# Patient Record
Sex: FEMALE | URBAN - METROPOLITAN AREA
[De-identification: names, ages, dates, MRNs, and addresses within clinical notes are randomized per-mention and may not be internally consistent; named-entity substitution may affect disease eponyms.]

---

## 2024-01-29 ENCOUNTER — OFFICE VISIT (OUTPATIENT)
Dept: URBAN - METROPOLITAN AREA CLINIC 113 | Facility: CLINIC | Age: 54
End: 2024-01-29

## 2024-01-31 ENCOUNTER — DASHBOARD ENCOUNTERS (OUTPATIENT)
Age: 54
End: 2024-01-31

## 2024-01-31 ENCOUNTER — OFFICE VISIT (OUTPATIENT)
Dept: URBAN - METROPOLITAN AREA CLINIC 113 | Facility: CLINIC | Age: 54
End: 2024-01-31
Payer: COMMERCIAL

## 2024-01-31 VITALS
HEIGHT: 62 IN | DIASTOLIC BLOOD PRESSURE: 92 MMHG | BODY MASS INDEX: 33.31 KG/M2 | RESPIRATION RATE: 18 BRPM | TEMPERATURE: 97.1 F | HEART RATE: 71 BPM | SYSTOLIC BLOOD PRESSURE: 138 MMHG | WEIGHT: 181 LBS

## 2024-01-31 DIAGNOSIS — K52.89 OTHER SPECIFIED NONINFECTIVE GASTROENTERITIS AND COLITIS: ICD-10-CM

## 2024-01-31 DIAGNOSIS — R10.84 GENERALIZED ABDOMINAL PAIN: ICD-10-CM

## 2024-01-31 DIAGNOSIS — K59.09 CHRONIC CONSTIPATION WITH OVERFLOW: ICD-10-CM

## 2024-01-31 PROCEDURE — 99204 OFFICE O/P NEW MOD 45 MIN: CPT

## 2024-01-31 RX ORDER — POLYETHYLENE GLYCOL 3350, SODIUM CHLORIDE, SODIUM BICARBONATE, POTASSIUM CHLORIDE 420; 11.2; 5.72; 1.48 G/4L; G/4L; G/4L; G/4L
AS DIRECTED POWDER, FOR SOLUTION ORAL ONCE
Qty: 420 GM | Refills: 0 | OUTPATIENT
Start: 2024-01-31 | End: 2024-03-01

## 2024-01-31 NOTE — HPI-TODAY'S VISIT:
53-year-old female presents for evaluation of abdominal pain.  She was seen in the ED on 12/14/2023.  CT scan of the abdomen/pelvis with contrast revealed marked wall thickening and mural edema in the distal ileum consistent with enteritis and could be secondary to inflammatory bowel disease.  She did have mildly enlarged mesenteric nodes which are likely reactive.  She did have small volume ascites.  Case was discussed with Dr. Sands who believed the patient small-volume ascites was likely secondary to the enteritis or IBD.  He recommended the patient to follow-up as an outpatient and recommended antibiotics at that time.  Abdominal exam was otherwise benign and labs were unremarkable.  She was discharged on ciprofloxacin.  She had a severe constipation exacerbation prior to her ER visit. She took off brand MOM which emptied her bowels however she felt ill the next day. She reported upper abdominal pain that radiated into her chest. This was associated with nausea and vomiting. This has since resolved. Symptoms improved following the course of antibiotics however she does still have intermittent abdominal pain. Denies fevers or chills.  She has constipation predominant bowel habits. She may go a week without a bowel movement followed by a blowout. She has had fecal incontinence while in bed three times over the last 6 months. She does endorse fecal urgency. She has nocturnal stools. Denies blood per rectum or melena. She had a colonoscopy over ten years ago. She cannot recall what this done for and what the results were. No family history of IBD. Her paternal uncle was diagnosed with colon cancer in his 60s.

## 2024-02-12 ENCOUNTER — COLON (OUTPATIENT)
Dept: URBAN - METROPOLITAN AREA MEDICAL CENTER 2 | Facility: MEDICAL CENTER | Age: 54
End: 2024-02-12

## 2024-02-12 RX ORDER — POLYETHYLENE GLYCOL 3350, SODIUM CHLORIDE, SODIUM BICARBONATE, POTASSIUM CHLORIDE 420; 11.2; 5.72; 1.48 G/4L; G/4L; G/4L; G/4L
AS DIRECTED POWDER, FOR SOLUTION ORAL ONCE
Qty: 420 GM | Refills: 0 | Status: ACTIVE | COMMUNITY
Start: 2024-01-31 | End: 2024-03-01

## 2024-04-23 ENCOUNTER — COLON (OUTPATIENT)
Dept: URBAN - METROPOLITAN AREA MEDICAL CENTER 2 | Facility: MEDICAL CENTER | Age: 54
End: 2024-04-23

## 2024-09-10 ENCOUNTER — CLAIMS CREATED FROM THE CLAIM WINDOW (OUTPATIENT)
Dept: URBAN - METROPOLITAN AREA MEDICAL CENTER 2 | Facility: MEDICAL CENTER | Age: 54
End: 2024-09-10
Payer: COMMERCIAL

## 2024-09-10 DIAGNOSIS — R11.2 ACUTE NAUSEA WITH NONBILIOUS VOMITING: ICD-10-CM

## 2024-09-10 DIAGNOSIS — R10.84 ABDOMINAL CRAMPING, GENERALIZED: ICD-10-CM

## 2024-09-10 DIAGNOSIS — R93.3 ABN FINDINGS-GI TRACT: ICD-10-CM

## 2024-09-10 PROCEDURE — 99254 IP/OBS CNSLTJ NEW/EST MOD 60: CPT | Performed by: STUDENT IN AN ORGANIZED HEALTH CARE EDUCATION/TRAINING PROGRAM

## 2024-09-10 PROCEDURE — 99222 1ST HOSP IP/OBS MODERATE 55: CPT | Performed by: STUDENT IN AN ORGANIZED HEALTH CARE EDUCATION/TRAINING PROGRAM

## 2024-09-10 PROCEDURE — G8427 DOCREV CUR MEDS BY ELIG CLIN: HCPCS | Performed by: STUDENT IN AN ORGANIZED HEALTH CARE EDUCATION/TRAINING PROGRAM

## 2024-09-11 ENCOUNTER — CLAIMS CREATED FROM THE CLAIM WINDOW (OUTPATIENT)
Dept: URBAN - METROPOLITAN AREA MEDICAL CENTER 2 | Facility: MEDICAL CENTER | Age: 54
End: 2024-09-11
Payer: COMMERCIAL

## 2024-09-11 DIAGNOSIS — K56.690 OTHER PARTIAL INTESTINAL OBSTRUCTION: ICD-10-CM

## 2024-09-11 DIAGNOSIS — K52.89 OTHER AND UNSPECIFIED NONINFECTIOUS GASTROENTERITIS AND COLITIS: ICD-10-CM

## 2024-09-11 PROCEDURE — 99232 SBSQ HOSP IP/OBS MODERATE 35: CPT | Performed by: STUDENT IN AN ORGANIZED HEALTH CARE EDUCATION/TRAINING PROGRAM

## 2024-09-12 ENCOUNTER — CLAIMS CREATED FROM THE CLAIM WINDOW (OUTPATIENT)
Dept: URBAN - METROPOLITAN AREA MEDICAL CENTER 2 | Facility: MEDICAL CENTER | Age: 54
End: 2024-09-12
Payer: COMMERCIAL

## 2024-09-12 DIAGNOSIS — K52.89 OTHER AND UNSPECIFIED NONINFECTIOUS GASTROENTERITIS AND COLITIS: ICD-10-CM

## 2024-09-12 DIAGNOSIS — K56.690 OTHER PARTIAL INTESTINAL OBSTRUCTION: ICD-10-CM

## 2024-09-12 PROCEDURE — 99232 SBSQ HOSP IP/OBS MODERATE 35: CPT | Performed by: STUDENT IN AN ORGANIZED HEALTH CARE EDUCATION/TRAINING PROGRAM

## 2024-09-13 ENCOUNTER — CLAIMS CREATED FROM THE CLAIM WINDOW (OUTPATIENT)
Dept: URBAN - METROPOLITAN AREA MEDICAL CENTER 2 | Facility: MEDICAL CENTER | Age: 54
End: 2024-09-13
Payer: COMMERCIAL

## 2024-09-13 DIAGNOSIS — K52.89 OTHER AND UNSPECIFIED NONINFECTIOUS GASTROENTERITIS AND COLITIS: ICD-10-CM

## 2024-09-13 DIAGNOSIS — K56.690 OTHER PARTIAL INTESTINAL OBSTRUCTION: ICD-10-CM

## 2024-09-13 PROCEDURE — 99232 SBSQ HOSP IP/OBS MODERATE 35: CPT | Performed by: INTERNAL MEDICINE

## 2024-09-14 ENCOUNTER — CLAIMS CREATED FROM THE CLAIM WINDOW (OUTPATIENT)
Dept: URBAN - METROPOLITAN AREA MEDICAL CENTER 2 | Facility: MEDICAL CENTER | Age: 54
End: 2024-09-14
Payer: COMMERCIAL

## 2024-09-14 DIAGNOSIS — R06.02 DYSPNEA: ICD-10-CM

## 2024-09-14 DIAGNOSIS — K56.690 OTHER PARTIAL INTESTINAL OBSTRUCTION: ICD-10-CM

## 2024-09-14 DIAGNOSIS — K52.89 OTHER AND UNSPECIFIED NONINFECTIOUS GASTROENTERITIS AND COLITIS: ICD-10-CM

## 2024-09-14 PROCEDURE — 99232 SBSQ HOSP IP/OBS MODERATE 35: CPT | Performed by: INTERNAL MEDICINE

## 2024-09-15 ENCOUNTER — CLAIMS CREATED FROM THE CLAIM WINDOW (OUTPATIENT)
Dept: URBAN - METROPOLITAN AREA MEDICAL CENTER 2 | Facility: MEDICAL CENTER | Age: 54
End: 2024-09-15
Payer: COMMERCIAL

## 2024-09-15 DIAGNOSIS — K56.690 OTHER PARTIAL INTESTINAL OBSTRUCTION: ICD-10-CM

## 2024-09-15 DIAGNOSIS — R06.02 BREATH SHORTNESS: ICD-10-CM

## 2024-09-15 DIAGNOSIS — K52.89 OTHER AND UNSPECIFIED NONINFECTIOUS GASTROENTERITIS AND COLITIS: ICD-10-CM

## 2024-09-15 PROCEDURE — 99232 SBSQ HOSP IP/OBS MODERATE 35: CPT | Performed by: INTERNAL MEDICINE

## 2024-09-16 ENCOUNTER — CLAIMS CREATED FROM THE CLAIM WINDOW (OUTPATIENT)
Dept: URBAN - METROPOLITAN AREA MEDICAL CENTER 2 | Facility: MEDICAL CENTER | Age: 54
End: 2024-09-16
Payer: COMMERCIAL

## 2024-09-16 DIAGNOSIS — K52.89 OTHER AND UNSPECIFIED NONINFECTIOUS GASTROENTERITIS AND COLITIS: ICD-10-CM

## 2024-09-16 DIAGNOSIS — K56.690 OTHER PARTIAL INTESTINAL OBSTRUCTION: ICD-10-CM

## 2024-09-16 PROCEDURE — 99232 SBSQ HOSP IP/OBS MODERATE 35: CPT | Performed by: INTERNAL MEDICINE

## 2024-09-17 ENCOUNTER — CLAIMS CREATED FROM THE CLAIM WINDOW (OUTPATIENT)
Dept: URBAN - METROPOLITAN AREA MEDICAL CENTER 2 | Facility: MEDICAL CENTER | Age: 54
End: 2024-09-17
Payer: COMMERCIAL

## 2024-09-17 DIAGNOSIS — K52.89 OTHER AND UNSPECIFIED NONINFECTIOUS GASTROENTERITIS AND COLITIS: ICD-10-CM

## 2024-09-17 DIAGNOSIS — K56.690 OTHER PARTIAL INTESTINAL OBSTRUCTION: ICD-10-CM

## 2024-09-17 PROCEDURE — 99232 SBSQ HOSP IP/OBS MODERATE 35: CPT | Performed by: INTERNAL MEDICINE

## 2024-09-18 ENCOUNTER — CLAIMS CREATED FROM THE CLAIM WINDOW (OUTPATIENT)
Dept: URBAN - METROPOLITAN AREA MEDICAL CENTER 2 | Facility: MEDICAL CENTER | Age: 54
End: 2024-09-18
Payer: COMMERCIAL

## 2024-09-18 DIAGNOSIS — K56.690 OTHER PARTIAL INTESTINAL OBSTRUCTION: ICD-10-CM

## 2024-09-18 DIAGNOSIS — K52.89 OTHER AND UNSPECIFIED NONINFECTIOUS GASTROENTERITIS AND COLITIS: ICD-10-CM

## 2024-09-18 PROCEDURE — 99232 SBSQ HOSP IP/OBS MODERATE 35: CPT | Performed by: INTERNAL MEDICINE

## 2024-09-19 ENCOUNTER — CLAIMS CREATED FROM THE CLAIM WINDOW (OUTPATIENT)
Dept: URBAN - METROPOLITAN AREA MEDICAL CENTER 2 | Facility: MEDICAL CENTER | Age: 54
End: 2024-09-19
Payer: COMMERCIAL

## 2024-09-19 DIAGNOSIS — R10.84 ABDOMINAL CRAMPING, GENERALIZED: ICD-10-CM

## 2024-09-19 DIAGNOSIS — K56.690 OTHER PARTIAL INTESTINAL OBSTRUCTION: ICD-10-CM

## 2024-09-19 DIAGNOSIS — R93.3 ABN FINDINGS-GI TRACT: ICD-10-CM

## 2024-09-19 DIAGNOSIS — K63.89 OTHER SPECIFIED DISEASES OF INTESTINE: ICD-10-CM

## 2024-09-19 PROCEDURE — 45380 COLONOSCOPY AND BIOPSY: CPT | Performed by: INTERNAL MEDICINE

## 2024-09-19 PROCEDURE — 45385 COLONOSCOPY W/LESION REMOVAL: CPT | Performed by: INTERNAL MEDICINE

## 2024-09-20 ENCOUNTER — CLAIMS CREATED FROM THE CLAIM WINDOW (OUTPATIENT)
Dept: URBAN - METROPOLITAN AREA MEDICAL CENTER 2 | Facility: MEDICAL CENTER | Age: 54
End: 2024-09-20
Payer: COMMERCIAL

## 2024-09-20 DIAGNOSIS — R93.3 ABN FINDINGS-GI TRACT: ICD-10-CM

## 2024-09-20 DIAGNOSIS — K56.690 OTHER PARTIAL INTESTINAL OBSTRUCTION: ICD-10-CM

## 2024-09-20 PROCEDURE — 99233 SBSQ HOSP IP/OBS HIGH 50: CPT | Performed by: INTERNAL MEDICINE

## 2024-09-21 ENCOUNTER — TELEPHONE ENCOUNTER (OUTPATIENT)
Dept: URBAN - METROPOLITAN AREA CLINIC 113 | Facility: CLINIC | Age: 54
End: 2024-09-21

## 2024-09-25 ENCOUNTER — TELEPHONE ENCOUNTER (OUTPATIENT)
Dept: URBAN - METROPOLITAN AREA CLINIC 113 | Facility: CLINIC | Age: 54
End: 2024-09-25

## 2024-10-02 ENCOUNTER — OFFICE VISIT (OUTPATIENT)
Dept: URBAN - METROPOLITAN AREA CLINIC 113 | Facility: CLINIC | Age: 54
End: 2024-10-02
Payer: COMMERCIAL

## 2024-10-02 ENCOUNTER — LAB OUTSIDE AN ENCOUNTER (OUTPATIENT)
Dept: URBAN - METROPOLITAN AREA CLINIC 113 | Facility: CLINIC | Age: 54
End: 2024-10-02

## 2024-10-02 VITALS
HEIGHT: 62 IN | SYSTOLIC BLOOD PRESSURE: 102 MMHG | HEART RATE: 76 BPM | RESPIRATION RATE: 18 BRPM | BODY MASS INDEX: 32.94 KG/M2 | TEMPERATURE: 98 F | DIASTOLIC BLOOD PRESSURE: 73 MMHG | WEIGHT: 179 LBS

## 2024-10-02 DIAGNOSIS — K56.699: ICD-10-CM

## 2024-10-02 DIAGNOSIS — R11.0 NAUSEA: ICD-10-CM

## 2024-10-02 DIAGNOSIS — R10.84 GENERALIZED ABDOMINAL PAIN: ICD-10-CM

## 2024-10-02 DIAGNOSIS — K52.89 (LYMPHOCYTIC) MICROSCOPIC COLITIS: ICD-10-CM

## 2024-10-02 PROCEDURE — 99214 OFFICE O/P EST MOD 30 MIN: CPT | Performed by: NURSE PRACTITIONER

## 2024-10-02 RX ORDER — ONDANSETRON 4 MG/1
1 TABLET TABLET, ORALLY DISINTEGRATING ORAL
Qty: 60 | Refills: 3 | OUTPATIENT
Start: 2024-10-02

## 2024-10-02 RX ORDER — ONDANSETRON HYDROCHLORIDE 4 MG/1
1 TABLET TABLET, FILM COATED ORAL ONCE A DAY
Status: ACTIVE | COMMUNITY

## 2024-10-02 RX ORDER — PANTOPRAZOLE SODIUM 40 MG/1
1 TABLET 1/2 TO 1 HOUR BEFORE MORNING MEAL TABLET, DELAYED RELEASE ORAL ONCE A DAY
Status: ACTIVE | COMMUNITY

## 2024-10-02 RX ORDER — OXYCODONE HYDROCHLORIDE 5 MG/1
1 TABLET AS NEEDED TABLET ORAL
Status: ACTIVE | COMMUNITY

## 2024-10-02 RX ORDER — PREDNISONE 10 MG/1
TAKE 4 TABS PO X 1 WEEK, TAKE 3 TABS PO X 1 WEEK, TAKE 2 TABS PO X 1 WEEK, TAKE 1 TAB PO X 1 WEEK TABLET ORAL ONCE A DAY
Qty: 70 | Refills: 0 | OUTPATIENT
Start: 2024-10-02 | End: 2024-11-01

## 2024-10-02 NOTE — HPI-TODAY'S VISIT:
This is a 53-year-old female with a history of nausea, vomiting, and abdominal pain secondary to a partial small bowel obstruction presenting for hospital follow-up. She was seen in hospital consultation 9/10/2024 after she presented to the hospital with the aforementioned complaints.  There was evidence of ileitis on CT scan.  She had a colonoscopy 9/19/2024 demonstrating a 5 mm sigmoid polyp status post removal and a benign-appearing intrinsic moderate stenosis that was not traversed in the terminal ileum status post biopsy.  Pathology was pending at discharge.  Review of her CT scan showed evidence of significant Crohn's disease.  There was concerned that much of her small bowel was adhesed to the lower pelvis and surgery may be difficult in her case.  It was discussed that biologic therapy may need to be initiated without a tissue diagnosis.  She was discharged on a full liquid diet. Per telephone encounter 9/21/2024 Dr. Landon remarked that Crohn's disease was in the differential diagnosis (favored).  The patient reported that she was not feeling well and was having a hard time with fecal incontinence.  She was having 10+ bowel movements per day.   She has been having frequent loose bowel movements with fecal incontinence since discharge.  However, 2 or 3 days ago, this improved.  She is having 2 soft bowel movements per day.  She is no longer experiencing fecal incontinence.  She denies red blood per rectum.  She has intermittent abdominal pain.  She has mild postprandial epigastric pain.  She has occasional pain through the center of her abdomen or in the right upper quadrant.  This pain is intermittent and may be associated with meals or with the need for a bowel movement.  She has intermittent nausea.  She denies vomiting.  She is compliant with pantoprazole 40 mg daily and denies acid reflux or dysphagia.  She has been on a full liquid diet.  She has been drinking boost or Ensure but finds it distasteful.  She tried eating chicken salad on crackers for 2 days and had worsening abdominal pain and nausea.  She has resumed her previous diet.  She reports a 15 pound weight loss since hospitalization.  She is no longer taking Oxcodone because she exhausted her supply last night.  She reports a fever of 103.6 two days ago.  She reports intermittent low-grade fevers.  She reports a lack of appetite and no energy.  She reports a sensation that she feels as though there is a "rope wrapped around the back of my tongue causing pain."  She denies painful swallowing or a sore throat.

## 2024-10-03 ENCOUNTER — TELEPHONE ENCOUNTER (OUTPATIENT)
Dept: URBAN - METROPOLITAN AREA CLINIC 113 | Facility: CLINIC | Age: 54
End: 2024-10-03

## 2024-10-03 LAB
ABSOLUTE BASOPHILS: 29
ABSOLUTE EOSINOPHILS: 72
ABSOLUTE LYMPHOCYTES: 907
ABSOLUTE MONOCYTES: 698
ABSOLUTE NEUTROPHILS: 5494
BASOPHILS: 0.4
EOSINOPHILS: 1
HEMATOCRIT: 32.7
HEMOGLOBIN: 10.1
LYMPHOCYTES: 12.6
MCH: 24.9
MCHC: 30.9
MCV: 80.5
MONOCYTES: 9.7
MPV: 9.3
NEUTROPHILS: 76.3
PLATELET COUNT: 507
RDW: 14
RED BLOOD CELL COUNT: 4.06
WHITE BLOOD CELL COUNT: 7.2

## 2024-10-08 ENCOUNTER — TELEPHONE ENCOUNTER (OUTPATIENT)
Dept: URBAN - METROPOLITAN AREA CLINIC 113 | Facility: CLINIC | Age: 54
End: 2024-10-08

## 2024-10-11 ENCOUNTER — TELEPHONE ENCOUNTER (OUTPATIENT)
Dept: URBAN - METROPOLITAN AREA CLINIC 113 | Facility: CLINIC | Age: 54
End: 2024-10-11

## 2024-10-28 ENCOUNTER — TELEPHONE ENCOUNTER (OUTPATIENT)
Dept: URBAN - METROPOLITAN AREA CLINIC 113 | Facility: CLINIC | Age: 54
End: 2024-10-28

## 2024-10-28 RX ORDER — DICYCLOMINE HYDROCHLORIDE 10 MG/1
1 TABLET CAPSULE ORAL
Qty: 60 | Refills: 3 | OUTPATIENT
Start: 2024-10-28 | End: 2025-02-24

## 2024-10-29 ENCOUNTER — TELEPHONE ENCOUNTER (OUTPATIENT)
Dept: URBAN - METROPOLITAN AREA CLINIC 5 | Facility: CLINIC | Age: 54
End: 2024-10-29

## 2024-11-04 ENCOUNTER — ERX REFILL RESPONSE (OUTPATIENT)
Dept: URBAN - METROPOLITAN AREA CLINIC 113 | Facility: CLINIC | Age: 54
End: 2024-11-04

## 2024-11-04 ENCOUNTER — TELEPHONE ENCOUNTER (OUTPATIENT)
Dept: URBAN - METROPOLITAN AREA CLINIC 113 | Facility: CLINIC | Age: 54
End: 2024-11-04

## 2024-11-04 PROBLEM — 56689002: Status: ACTIVE | Noted: 2024-11-04

## 2024-11-04 PROBLEM — 52457000: Status: ACTIVE | Noted: 2024-11-04

## 2024-11-04 PROBLEM — 23065003: Status: ACTIVE | Noted: 2024-11-04

## 2024-11-04 RX ORDER — RISANKIZUMAB-RZAA 180 MG/1.2
AT WEEK 12 KIT SUBCUTANEOUS
Qty: 180 | Refills: 0 | OUTPATIENT
Start: 2024-11-04 | End: 2024-11-04

## 2024-11-04 RX ORDER — RISANKIZUMAB-RZAA 60 MG/ML
IV AS DIRECTED INJECTION INTRAVENOUS
Qty: 600 | Refills: 0 | OUTPATIENT
Start: 2024-11-04 | End: 2025-01-03

## 2024-11-04 RX ORDER — PREDNISONE 10 MG/1
TAKE 4 TABS PO X 1 WEEK, TAKE 3 TABS PO X 1 WEEK, TAKE 2 TABS PO X 1 WEEK, TAKE 1 TAB PO X 1 WEEK TABLET ORAL ONCE A DAY
Qty: 70 | Refills: 0 | OUTPATIENT
Start: 2024-10-02 | End: 2024-11-04

## 2024-11-04 RX ORDER — RISANKIZUMAB-RZAA 180 MG/1.2
AT WEEK 12 KIT SUBCUTANEOUS
Qty: 180 | Refills: 0 | OUTPATIENT
Start: 2024-11-04

## 2024-11-04 RX ORDER — PREDNISONE 10 MG/1
TAKE 4 TABS PO X 1 WEEK, TAKE 3 TABS PO X 1 WEEK, TAKE 2 TABS PO X 1 WEEK, TAKE 1 TAB PO X 1 WEEK TABLET ORAL ONCE A DAY
Qty: 70 | Refills: 0
Start: 2024-10-02 | End: 2024-12-04

## 2024-11-06 ENCOUNTER — TELEPHONE ENCOUNTER (OUTPATIENT)
Dept: URBAN - METROPOLITAN AREA CLINIC 113 | Facility: CLINIC | Age: 54
End: 2024-11-06

## 2024-11-06 ENCOUNTER — TELEPHONE ENCOUNTER (OUTPATIENT)
Dept: URBAN - METROPOLITAN AREA CLINIC 23 | Facility: CLINIC | Age: 54
End: 2024-11-06

## 2024-11-06 RX ORDER — PREDNISONE 10 MG/1
TAKE 4 TABS PO X 1 WEEK, TAKE 3 TABS PO X 1 WEEK, TAKE 2 TABS PO X 1 WEEK, TAKE 1 TAB PO X 1 WEEK TABLET ORAL ONCE A DAY
Qty: 70 | Refills: 0
Start: 2024-10-02 | End: 2024-12-06

## 2024-11-08 ENCOUNTER — TELEPHONE ENCOUNTER (OUTPATIENT)
Dept: URBAN - METROPOLITAN AREA CLINIC 113 | Facility: CLINIC | Age: 54
End: 2024-11-08

## 2024-11-08 LAB
ABSOLUTE BASOPHILS: 20
ABSOLUTE EOSINOPHILS: 100
ABSOLUTE LYMPHOCYTES: 1360
ABSOLUTE MONOCYTES: 760
ABSOLUTE NEUTROPHILS: 7760
APPEARANCE: (no result)
BACTERIA: (no result)
BASOPHILS: 0.2
BILIRUBIN: NEGATIVE
COLOR: (no result)
CULTURE: (no result)
EOSINOPHILS: 1
GLUCOSE: NEGATIVE
HEMATOCRIT: 38.7
HEMOGLOBIN: 12
HEPATITIS B SURFACE AB IMMUNITY, QN: <5
HEPATITIS B SURFACE ANTIGEN: (no result)
HYALINE CAST: (no result)
KETONES: (no result)
LEUKOCYTE ESTERASE: (no result)
LYMPHOCYTES: 13.6
MCH: 25
MCHC: 31
MCV: 80.6
MONOCYTES: 7.6
MPV: 9
NEUTROPHILS: 77.6
NITRITE: NEGATIVE
NOTE: (no result)
OCCULT BLOOD: NEGATIVE
PH: (no result)
PLATELET COUNT: 521
PROTEIN: (no result)
QUANTIFERON-TB GOLD PLUS: (no result)
RBC: (no result)
RDW: 16.6
RED BLOOD CELL COUNT: 4.8
REFLEXIVE URINE CULTURE: (no result)
SPECIFIC GRAVITY: 1.02
SQUAMOUS EPITHELIAL CELLS: (no result)
WBC: (no result)
WHITE BLOOD CELL COUNT: 10

## 2024-11-09 ENCOUNTER — TELEPHONE ENCOUNTER (OUTPATIENT)
Dept: URBAN - METROPOLITAN AREA CLINIC 113 | Facility: CLINIC | Age: 54
End: 2024-11-09

## 2024-11-13 ENCOUNTER — LAB OUTSIDE AN ENCOUNTER (OUTPATIENT)
Dept: URBAN - METROPOLITAN AREA CLINIC 113 | Facility: CLINIC | Age: 54
End: 2024-11-13

## 2024-11-13 ENCOUNTER — TELEPHONE ENCOUNTER (OUTPATIENT)
Dept: URBAN - METROPOLITAN AREA CLINIC 113 | Facility: CLINIC | Age: 54
End: 2024-11-13

## 2024-11-16 LAB
ABSOLUTE BAND NEUTROPHILS: 261
ABSOLUTE BASOPHILS: 0
ABSOLUTE EOSINOPHILS: 0
ABSOLUTE LYMPHOCYTES: 2610
ABSOLUTE METAMYELOCYTES: 348
ABSOLUTE MONOCYTES: 696
ABSOLUTE NEUTROPHILS: 4785
BAND NEUTROPHILS: 3
BASOPHILS: 0
EOSINOPHILS: 0
HEMATOCRIT: 37.4
HEMOGLOBIN: 11.4
LYMPHOCYTES: 30
MCH: 25.2
MCHC: 30.5
MCV: 82.6
METAMYELOCYTES: 4
MITOGEN-NIL: >10
MONOCYTES: 8
MPV: 8.6
NEUTROPHILS: 55
NOTE: (no result)
PLATELET COUNT: 559
QUANTIFERON NIL VALUE: 0.08
QUANTIFERON TB1 AG VALUE: 0.01
QUANTIFERON TB2 AG VALUE: 0.05
QUANTIFERON-TB GOLD PLUS: NEGATIVE
RDW: 16.8
RED BLOOD CELL COUNT: 4.53
WHITE BLOOD CELL COUNT: 8.7

## 2024-11-18 ENCOUNTER — OFFICE VISIT (OUTPATIENT)
Dept: URBAN - METROPOLITAN AREA CLINIC 113 | Facility: CLINIC | Age: 54
End: 2024-11-18

## 2024-11-21 ENCOUNTER — TELEPHONE ENCOUNTER (OUTPATIENT)
Dept: URBAN - METROPOLITAN AREA CLINIC 113 | Facility: CLINIC | Age: 54
End: 2024-11-21

## 2024-11-26 ENCOUNTER — OFFICE VISIT (OUTPATIENT)
Dept: URBAN - METROPOLITAN AREA CLINIC 113 | Facility: CLINIC | Age: 54
End: 2024-11-26
Payer: COMMERCIAL

## 2024-11-26 VITALS
SYSTOLIC BLOOD PRESSURE: 132 MMHG | DIASTOLIC BLOOD PRESSURE: 86 MMHG | RESPIRATION RATE: 19 BRPM | HEART RATE: 78 BPM | BODY MASS INDEX: 35.41 KG/M2 | WEIGHT: 192.4 LBS | HEIGHT: 62 IN | TEMPERATURE: 97.3 F

## 2024-11-26 DIAGNOSIS — K50.018 CROHN'S DISEASE OF SMALL INTESTINE WITH OTHER COMPLICATION: ICD-10-CM

## 2024-11-26 PROCEDURE — 99214 OFFICE O/P EST MOD 30 MIN: CPT | Performed by: STUDENT IN AN ORGANIZED HEALTH CARE EDUCATION/TRAINING PROGRAM

## 2024-11-26 RX ORDER — ONDANSETRON HYDROCHLORIDE 4 MG/1
1 TABLET TABLET, FILM COATED ORAL ONCE A DAY
Status: ON HOLD | COMMUNITY

## 2024-11-26 RX ORDER — RISANKIZUMAB-RZAA 60 MG/ML
IV AS DIRECTED INJECTION INTRAVENOUS
Qty: 600 | Refills: 0 | Status: ON HOLD | COMMUNITY
Start: 2024-11-04 | End: 2025-01-03

## 2024-11-26 RX ORDER — OXYCODONE HYDROCHLORIDE 5 MG/1
1 TABLET AS NEEDED TABLET ORAL
Status: ON HOLD | COMMUNITY

## 2024-11-26 RX ORDER — PANTOPRAZOLE SODIUM 40 MG/1
1 TABLET 1/2 TO 1 HOUR BEFORE MORNING MEAL TABLET, DELAYED RELEASE ORAL ONCE A DAY
Status: ON HOLD | COMMUNITY

## 2024-11-26 RX ORDER — DICYCLOMINE HYDROCHLORIDE 10 MG/1
1 TABLET CAPSULE ORAL
Qty: 60 | Refills: 3 | Status: ON HOLD | COMMUNITY
Start: 2024-10-28 | End: 2025-02-24

## 2024-11-26 RX ORDER — ONDANSETRON 4 MG/1
1 TABLET TABLET, ORALLY DISINTEGRATING ORAL
Qty: 60 | Refills: 3 | Status: ON HOLD | COMMUNITY
Start: 2024-10-02

## 2024-11-26 RX ORDER — PREDNISONE 10 MG/1
TAKE 4 TABS PO X 1 WEEK, TAKE 3 TABS PO X 1 WEEK, TAKE 2 TABS PO X 1 WEEK, TAKE 1 TAB PO X 1 WEEK TABLET ORAL ONCE A DAY
Qty: 70 | Refills: 0 | Status: ACTIVE | COMMUNITY
Start: 2024-10-02 | End: 2024-12-06

## 2024-11-26 NOTE — HPI-TODAY'S VISIT:
Last visit 10/2/24 Seen by Merlyn Dutton NP This is a 53-year-old female with a history of nausea, vomiting, and abdominal pain secondary to a partial small bowel obstruction presenting for hospital follow-up. She was seen in hospital consultation 9/10/2024 after she presented to the hospital with the aforementioned complaints.  There was evidence of ileitis on CT scan.  She had a colonoscopy 9/19/2024 demonstrating a 5 mm sigmoid polyp status post removal and a benign-appearing intrinsic moderate stenosis that was not traversed in the terminal ileum status post biopsy.  Pathology was pending at discharge.  Review of her CT scan showed evidence of significant Crohn's disease.  There was concerned that much of her small bowel was adhesed to the lower pelvis and surgery may be difficult in her case.  It was discussed that biologic therapy may need to be initiated without a tissue diagnosis.  She was discharged on a full liquid diet. Per telephone encounter 9/21/2024 Dr. Landon remarked that Crohn's disease was in the differential diagnosis (favored).  The patient reported that she was not feeling well and was having a hard time with fecal incontinence.  She was having 10+ bowel movements per day.   She has been having frequent loose bowel movements with fecal incontinence since discharge.  However, 2 or 3 days ago, this improved.  She is having 2 soft bowel movements per day.  She is no longer experiencing fecal incontinence.  She denies red blood per rectum.  She has intermittent abdominal pain.  She has mild postprandial epigastric pain.  She has occasional pain through the center of her abdomen or in the right upper quadrant.  This pain is intermittent and may be associated with meals or with the need for a bowel movement.  She has intermittent nausea.  She denies vomiting.  She is compliant with pantoprazole 40 mg daily and denies acid reflux or dysphagia.  She has been on a full liquid diet.  She has been drinking boost or Ensure but finds it distasteful.  She tried eating chicken salad on crackers for 2 days and had worsening abdominal pain and nausea.  She has resumed her previous diet.  She reports a 15 pound weight loss since hospitalization.  She is no longer taking Oxcodone because she exhausted her supply last night.  She reports a fever of 103.6 two days ago.  She reports intermittent low-grade fevers.  She reports a lack of appetite and no energy.  She reports a sensation that she feels as though there is a "rope wrapped around the back of my tongue causing pain."  She denies painful swallowing or a sore throat. . Follow up visit 11/26/24 She is currently on a prednisone taper, she is having bowel movements. She continues to have pain in the RLQ with some radiation into the krzysztof-umbilical region. She has been on a low fiber diet. She denies fevers/chills. She has met with a surgeon, Dr. 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 She has been contacted regarding intiation of Skyrizi, she will continue to work to secure this medication.

## 2024-12-06 ENCOUNTER — TELEPHONE ENCOUNTER (OUTPATIENT)
Dept: URBAN - METROPOLITAN AREA CLINIC 113 | Facility: CLINIC | Age: 54
End: 2024-12-06

## 2024-12-16 ENCOUNTER — OFFICE VISIT (OUTPATIENT)
Dept: URBAN - METROPOLITAN AREA CLINIC 112 | Facility: CLINIC | Age: 54
End: 2024-12-16

## 2024-12-16 ENCOUNTER — TELEPHONE ENCOUNTER (OUTPATIENT)
Dept: URBAN - METROPOLITAN AREA CLINIC 113 | Facility: CLINIC | Age: 54
End: 2024-12-16

## 2024-12-16 RX ORDER — ONDANSETRON HYDROCHLORIDE 4 MG/1
1 TABLET TABLET, FILM COATED ORAL ONCE A DAY
Status: ON HOLD | COMMUNITY

## 2024-12-16 RX ORDER — RISANKIZUMAB-RZAA 60 MG/ML
IV AS DIRECTED INJECTION INTRAVENOUS
Qty: 600 | Refills: 0 | Status: ON HOLD | COMMUNITY
Start: 2024-11-04 | End: 2025-01-03

## 2024-12-16 RX ORDER — PANTOPRAZOLE SODIUM 40 MG/1
1 TABLET 1/2 TO 1 HOUR BEFORE MORNING MEAL TABLET, DELAYED RELEASE ORAL ONCE A DAY
Status: ON HOLD | COMMUNITY

## 2024-12-16 RX ORDER — OXYCODONE HYDROCHLORIDE 5 MG/1
1 TABLET AS NEEDED TABLET ORAL
Status: ON HOLD | COMMUNITY

## 2024-12-16 RX ORDER — ONDANSETRON 4 MG/1
1 TABLET TABLET, ORALLY DISINTEGRATING ORAL
Qty: 60 | Refills: 3 | Status: ON HOLD | COMMUNITY
Start: 2024-10-02

## 2024-12-16 RX ORDER — DICYCLOMINE HYDROCHLORIDE 10 MG/1
1 TABLET CAPSULE ORAL
Qty: 60 | Refills: 3 | Status: ON HOLD | COMMUNITY
Start: 2024-10-28 | End: 2025-02-24

## 2024-12-23 ENCOUNTER — OFFICE VISIT (OUTPATIENT)
Dept: URBAN - METROPOLITAN AREA CLINIC 112 | Facility: CLINIC | Age: 54
End: 2024-12-23

## 2024-12-26 ENCOUNTER — TELEPHONE ENCOUNTER (OUTPATIENT)
Dept: URBAN - METROPOLITAN AREA CLINIC 113 | Facility: CLINIC | Age: 54
End: 2024-12-26

## 2024-12-26 RX ORDER — RISANKIZUMAB-RZAA 60 MG/ML
600 MG INJECTION INTRAVENOUS
Qty: 600 UNSPECIFIED | Refills: 2
Start: 2024-11-04 | End: 2025-06-24

## 2024-12-27 ENCOUNTER — OFFICE VISIT (OUTPATIENT)
Dept: URBAN - METROPOLITAN AREA CLINIC 112 | Facility: CLINIC | Age: 54
End: 2024-12-27
Payer: COMMERCIAL

## 2024-12-27 VITALS
SYSTOLIC BLOOD PRESSURE: 104 MMHG | WEIGHT: 193 LBS | HEIGHT: 62 IN | DIASTOLIC BLOOD PRESSURE: 70 MMHG | HEART RATE: 69 BPM | TEMPERATURE: 96.5 F | BODY MASS INDEX: 35.51 KG/M2 | RESPIRATION RATE: 18 BRPM

## 2024-12-27 DIAGNOSIS — K50.018 CROHN'S DISEASE OF SMALL INTESTINE WITH OTHER COMPLICATION: ICD-10-CM

## 2024-12-27 PROCEDURE — 96415 CHEMO IV INFUSION ADDL HR: CPT | Performed by: INTERNAL MEDICINE

## 2024-12-27 PROCEDURE — 96413 CHEMO IV INFUSION 1 HR: CPT | Performed by: INTERNAL MEDICINE

## 2024-12-27 RX ORDER — ONDANSETRON 4 MG/1
1 TABLET TABLET, ORALLY DISINTEGRATING ORAL
Qty: 60 | Refills: 3 | Status: ON HOLD | COMMUNITY
Start: 2024-10-02

## 2024-12-27 RX ORDER — OXYCODONE HYDROCHLORIDE 5 MG/1
1 TABLET AS NEEDED TABLET ORAL
Status: ON HOLD | COMMUNITY

## 2024-12-27 RX ORDER — PANTOPRAZOLE SODIUM 40 MG/1
1 TABLET 1/2 TO 1 HOUR BEFORE MORNING MEAL TABLET, DELAYED RELEASE ORAL ONCE A DAY
Status: ON HOLD | COMMUNITY

## 2024-12-27 RX ORDER — ONDANSETRON HYDROCHLORIDE 4 MG/1
1 TABLET TABLET, FILM COATED ORAL ONCE A DAY
Status: ON HOLD | COMMUNITY

## 2024-12-27 RX ORDER — DICYCLOMINE HYDROCHLORIDE 10 MG/1
1 TABLET CAPSULE ORAL
Qty: 60 | Refills: 3 | Status: ON HOLD | COMMUNITY
Start: 2024-10-28 | End: 2025-02-24

## 2024-12-27 RX ORDER — RISANKIZUMAB-RZAA 60 MG/ML
600 MG INJECTION INTRAVENOUS
Qty: 600 UNSPECIFIED | Refills: 2 | Status: ACTIVE | COMMUNITY
Start: 2024-11-04 | End: 2025-06-24

## 2024-12-31 ENCOUNTER — TELEPHONE ENCOUNTER (OUTPATIENT)
Dept: URBAN - METROPOLITAN AREA CLINIC 113 | Facility: CLINIC | Age: 54
End: 2024-12-31

## 2025-01-09 ENCOUNTER — TELEPHONE ENCOUNTER (OUTPATIENT)
Dept: URBAN - METROPOLITAN AREA CLINIC 113 | Facility: CLINIC | Age: 55
End: 2025-01-09

## 2025-01-21 ENCOUNTER — OFFICE VISIT (OUTPATIENT)
Dept: URBAN - METROPOLITAN AREA CLINIC 112 | Facility: CLINIC | Age: 55
End: 2025-01-21

## 2025-01-24 ENCOUNTER — OFFICE VISIT (OUTPATIENT)
Dept: URBAN - METROPOLITAN AREA CLINIC 112 | Facility: CLINIC | Age: 55
End: 2025-01-24

## 2025-01-27 ENCOUNTER — OFFICE VISIT (OUTPATIENT)
Dept: URBAN - METROPOLITAN AREA CLINIC 113 | Facility: CLINIC | Age: 55
End: 2025-01-27

## 2025-02-03 ENCOUNTER — TELEPHONE ENCOUNTER (OUTPATIENT)
Dept: URBAN - METROPOLITAN AREA CLINIC 113 | Facility: CLINIC | Age: 55
End: 2025-02-03

## 2025-02-17 ENCOUNTER — OFFICE VISIT (OUTPATIENT)
Dept: URBAN - METROPOLITAN AREA CLINIC 113 | Facility: CLINIC | Age: 55
End: 2025-02-17

## 2025-02-17 ENCOUNTER — OFFICE VISIT (OUTPATIENT)
Dept: URBAN - METROPOLITAN AREA CLINIC 112 | Facility: CLINIC | Age: 55
End: 2025-02-17

## 2025-02-19 ENCOUNTER — LAB OUTSIDE AN ENCOUNTER (OUTPATIENT)
Dept: URBAN - METROPOLITAN AREA CLINIC 113 | Facility: CLINIC | Age: 55
End: 2025-02-19

## 2025-02-19 ENCOUNTER — OFFICE VISIT (OUTPATIENT)
Dept: URBAN - METROPOLITAN AREA CLINIC 113 | Facility: CLINIC | Age: 55
End: 2025-02-19
Payer: COMMERCIAL

## 2025-02-19 VITALS
WEIGHT: 195 LBS | SYSTOLIC BLOOD PRESSURE: 119 MMHG | RESPIRATION RATE: 18 BRPM | BODY MASS INDEX: 35.88 KG/M2 | HEART RATE: 87 BPM | TEMPERATURE: 97.9 F | HEIGHT: 62 IN | DIASTOLIC BLOOD PRESSURE: 81 MMHG

## 2025-02-19 DIAGNOSIS — K21.9 GASTROESOPHAGEAL REFLUX DISEASE, UNSPECIFIED WHETHER ESOPHAGITIS PRESENT: ICD-10-CM

## 2025-02-19 DIAGNOSIS — K50.018 CROHN'S DISEASE OF SMALL INTESTINE WITH OTHER COMPLICATION: ICD-10-CM

## 2025-02-19 DIAGNOSIS — R06.02 SHORTNESS OF BREATH: ICD-10-CM

## 2025-02-19 DIAGNOSIS — K52.9 CHRONIC DIARRHEA: ICD-10-CM

## 2025-02-19 PROBLEM — 235595009: Status: ACTIVE | Noted: 2025-02-19

## 2025-02-19 PROCEDURE — 99214 OFFICE O/P EST MOD 30 MIN: CPT | Performed by: STUDENT IN AN ORGANIZED HEALTH CARE EDUCATION/TRAINING PROGRAM

## 2025-02-19 RX ORDER — COLESTIPOL HYDROCHLORIDE 1 G/1
2 TABLETS TABLET, FILM COATED ORAL ONCE A DAY
Qty: 180 TABLET | Refills: 1 | OUTPATIENT
Start: 2025-02-19

## 2025-02-19 RX ORDER — OMEPRAZOLE 40 MG/1
1 CAPSULE 30 MINUTES BEFORE MORNING MEAL CAPSULE, DELAYED RELEASE ORAL TWICE A DAY
Qty: 180 | Refills: 0 | OUTPATIENT
Start: 2025-02-19

## 2025-02-19 RX ORDER — OXYCODONE HYDROCHLORIDE 5 MG/1
1 TABLET AS NEEDED TABLET ORAL
Status: ON HOLD | COMMUNITY

## 2025-02-19 RX ORDER — ONDANSETRON 4 MG/1
1 TABLET TABLET, ORALLY DISINTEGRATING ORAL
Qty: 60 | Refills: 3 | Status: ON HOLD | COMMUNITY
Start: 2024-10-02

## 2025-02-19 RX ORDER — RISANKIZUMAB-RZAA 60 MG/ML
600 MG INJECTION INTRAVENOUS
Qty: 600 UNSPECIFIED | Refills: 2 | Status: ACTIVE | COMMUNITY
Start: 2024-11-04 | End: 2025-06-24

## 2025-02-19 RX ORDER — ONDANSETRON HYDROCHLORIDE 4 MG/1
1 TABLET TABLET, FILM COATED ORAL ONCE A DAY
Status: ACTIVE | COMMUNITY

## 2025-02-19 RX ORDER — DICYCLOMINE HYDROCHLORIDE 10 MG/1
1 TABLET CAPSULE ORAL
Qty: 60 | Refills: 3 | Status: ON HOLD | COMMUNITY
Start: 2024-10-28 | End: 2025-02-24

## 2025-02-19 RX ORDER — PANTOPRAZOLE SODIUM 40 MG/1
1 TABLET 1/2 TO 1 HOUR BEFORE MORNING MEAL TABLET, DELAYED RELEASE ORAL ONCE A DAY
Status: ON HOLD | COMMUNITY

## 2025-02-19 NOTE — HPI-TODAY'S VISIT:
Last visit 10/2/24 Seen by Merlyn Dutton NP This is a 53-year-old female with a history of nausea, vomiting, and abdominal pain secondary to a partial small bowel obstruction presenting for hospital follow-up. She was seen in hospital consultation 9/10/2024 after she presented to the hospital with the aforementioned complaints.  There was evidence of ileitis on CT scan.  She had a colonoscopy 9/19/2024 demonstrating a 5 mm sigmoid polyp status post removal and a benign-appearing intrinsic moderate stenosis that was not traversed in the terminal ileum status post biopsy.  Pathology was pending at discharge.  Review of her CT scan showed evidence of significant Crohn's disease.  There was concerned that much of her small bowel was adhesed to the lower pelvis and surgery may be difficult in her case.  It was discussed that biologic therapy may need to be initiated without a tissue diagnosis.  She was discharged on a full liquid diet. Per telephone encounter 9/21/2024 Dr. Landon remarked that Crohn's disease was in the differential diagnosis (favored).  The patient reported that she was not feeling well and was having a hard time with fecal incontinence.  She was having 10+ bowel movements per day.   She has been having frequent loose bowel movements with fecal incontinence since discharge.  However, 2 or 3 days ago, this improved.  She is having 2 soft bowel movements per day.  She is no longer experiencing fecal incontinence.  She denies red blood per rectum.  She has intermittent abdominal pain.  She has mild postprandial epigastric pain.  She has occasional pain through the center of her abdomen or in the right upper quadrant.  This pain is intermittent and may be associated with meals or with the need for a bowel movement.  She has intermittent nausea.  She denies vomiting.  She is compliant with pantoprazole 40 mg daily and denies acid reflux or dysphagia.  She has been on a full liquid diet.  She has been drinking boost or Ensure but finds it distasteful.  She tried eating chicken salad on crackers for 2 days and had worsening abdominal pain and nausea.  She has resumed her previous diet.  She reports a 15 pound weight loss since hospitalization.  She is no longer taking Oxcodone because she exhausted her supply last night.  She reports a fever of 103.6 two days ago.  She reports intermittent low-grade fevers.  She reports a lack of appetite and no energy.  She reports a sensation that she feels as though there is a "rope wrapped around the back of my tongue causing pain."  She denies painful swallowing or a sore throat. . Follow up visit 11/26/24 She is currently on a prednisone taper, she is having bowel movements. She continues to have pain in the RLQ with some radiation into the krzysztof-umbilical region. She has been on a low fiber diet. She denies fevers/chills. She has met with a surgeon, Dr. 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 She has been contacted regarding intiation of Skyrizi, she will continue to work to secure this medication. . Follow-up visit 2/19/2025 She underwent laparoscopic converted to open ileocolectomy on 1/24/2025 with Dr. Bolden.  She was found to have terminal ileitis with an ileal stricture that was removed.  She was discharged home on postoperative day 3 after having bowel movements and tolerating a regular diet. She has started Skyrizi.  Her next infusion is 2/21/2025. She states that since her surgery she has been markedly short of breath, this has not improved, she gets short of breath with activity as well as talking. She has post-prandial diarrhea, non-bloody, this started since her surgery. She also complains of substernal burning and frequent throat clearing.

## 2025-02-19 NOTE — PHYSICAL EXAM GASTROINTESTINAL
Abdomen , soft, nontender, nondistended , no guarding or rigidity , no masses palpable , normal bowel sounds , surgical scars noted to be clean, dry and intact Liver and Spleen , no hepatosplenomegaly

## 2025-02-21 ENCOUNTER — TELEPHONE ENCOUNTER (OUTPATIENT)
Dept: URBAN - METROPOLITAN AREA CLINIC 113 | Facility: CLINIC | Age: 55
End: 2025-02-21

## 2025-02-21 ENCOUNTER — OFFICE VISIT (OUTPATIENT)
Dept: URBAN - METROPOLITAN AREA CLINIC 112 | Facility: CLINIC | Age: 55
End: 2025-02-21
Payer: COMMERCIAL

## 2025-02-21 VITALS
WEIGHT: 196 LBS | TEMPERATURE: 96.8 F | HEART RATE: 72 BPM | DIASTOLIC BLOOD PRESSURE: 76 MMHG | BODY MASS INDEX: 36.07 KG/M2 | RESPIRATION RATE: 18 BRPM | SYSTOLIC BLOOD PRESSURE: 109 MMHG | HEIGHT: 62 IN

## 2025-02-21 DIAGNOSIS — K50.018 CROHN'S DISEASE OF SMALL INTESTINE WITH OTHER COMPLICATION: ICD-10-CM

## 2025-02-21 PROCEDURE — 96413 CHEMO IV INFUSION 1 HR: CPT | Performed by: INTERNAL MEDICINE

## 2025-02-21 RX ORDER — PANTOPRAZOLE SODIUM 40 MG/1
1 TABLET 1/2 TO 1 HOUR BEFORE MORNING MEAL TABLET, DELAYED RELEASE ORAL ONCE A DAY
Status: ON HOLD | COMMUNITY

## 2025-02-21 RX ORDER — ONDANSETRON 4 MG/1
1 TABLET TABLET, ORALLY DISINTEGRATING ORAL
Qty: 60 | Refills: 3 | Status: ON HOLD | COMMUNITY
Start: 2024-10-02

## 2025-02-21 RX ORDER — ONDANSETRON HYDROCHLORIDE 4 MG/1
1 TABLET TABLET, FILM COATED ORAL ONCE A DAY
Status: ACTIVE | COMMUNITY

## 2025-02-21 RX ORDER — DICYCLOMINE HYDROCHLORIDE 10 MG/1
1 TABLET CAPSULE ORAL
Qty: 60 | Refills: 3 | Status: ON HOLD | COMMUNITY
Start: 2024-10-28 | End: 2025-02-24

## 2025-02-21 RX ORDER — RISANKIZUMAB-RZAA 60 MG/ML
600 MG INJECTION INTRAVENOUS
Qty: 600 UNSPECIFIED | Refills: 2 | Status: ACTIVE | COMMUNITY
Start: 2024-11-04 | End: 2025-06-24

## 2025-02-21 RX ORDER — OXYCODONE HYDROCHLORIDE 5 MG/1
1 TABLET AS NEEDED TABLET ORAL
Status: ON HOLD | COMMUNITY

## 2025-02-21 RX ORDER — COLESTIPOL HYDROCHLORIDE 1 G/1
2 TABLETS TABLET, FILM COATED ORAL ONCE A DAY
Qty: 180 TABLET | Refills: 1 | Status: ACTIVE | COMMUNITY
Start: 2025-02-19

## 2025-02-21 RX ORDER — OMEPRAZOLE 40 MG/1
1 CAPSULE 30 MINUTES BEFORE MORNING MEAL CAPSULE, DELAYED RELEASE ORAL TWICE A DAY
Qty: 180 | Refills: 0 | Status: ACTIVE | COMMUNITY
Start: 2025-02-19

## 2025-02-23 LAB
ABSOLUTE BASOPHILS: 30
ABSOLUTE EOSINOPHILS: 254
ABSOLUTE LYMPHOCYTES: 1965
ABSOLUTE MONOCYTES: 443
ABSOLUTE NEUTROPHILS: 3210
BASOPHILS: 0.5
D-DIMER, QUANTITATIVE: 1.12
EOSINOPHILS: 4.3
HEMATOCRIT: 37.3
HEMOGLOBIN: 12.2
LYMPHOCYTES: 33.3
MCH: 25.2
MCHC: 32.7
MCV: 77.1
MONOCYTES: 7.5
MPV: 9.6
NEUTROPHILS: 54.4
PLATELET COUNT: 448
RDW: 14.6
RED BLOOD CELL COUNT: 4.84
WHITE BLOOD CELL COUNT: 5.9

## 2025-03-05 ENCOUNTER — TELEPHONE ENCOUNTER (OUTPATIENT)
Dept: URBAN - METROPOLITAN AREA CLINIC 113 | Facility: CLINIC | Age: 55
End: 2025-03-05

## 2025-03-05 ENCOUNTER — OFFICE VISIT (OUTPATIENT)
Dept: URBAN - METROPOLITAN AREA CLINIC 113 | Facility: CLINIC | Age: 55
End: 2025-03-05
Payer: COMMERCIAL

## 2025-03-05 VITALS
TEMPERATURE: 97.9 F | HEART RATE: 98 BPM | SYSTOLIC BLOOD PRESSURE: 88 MMHG | DIASTOLIC BLOOD PRESSURE: 58 MMHG | BODY MASS INDEX: 36.62 KG/M2 | WEIGHT: 199 LBS | RESPIRATION RATE: 18 BRPM | HEIGHT: 62 IN

## 2025-03-05 DIAGNOSIS — K50.018 CROHN'S DISEASE OF SMALL INTESTINE WITH OTHER COMPLICATION: ICD-10-CM

## 2025-03-05 DIAGNOSIS — K21.9 GASTROESOPHAGEAL REFLUX DISEASE, UNSPECIFIED WHETHER ESOPHAGITIS PRESENT: ICD-10-CM

## 2025-03-05 DIAGNOSIS — R06.02 SHORTNESS OF BREATH: ICD-10-CM

## 2025-03-05 DIAGNOSIS — R19.7 CHRONIC DIARRHEA: ICD-10-CM

## 2025-03-05 PROCEDURE — 99214 OFFICE O/P EST MOD 30 MIN: CPT | Performed by: STUDENT IN AN ORGANIZED HEALTH CARE EDUCATION/TRAINING PROGRAM

## 2025-03-05 RX ORDER — RISANKIZUMAB-RZAA 60 MG/ML
600 MG INJECTION INTRAVENOUS
Qty: 600 UNSPECIFIED | Refills: 2 | Status: ACTIVE | COMMUNITY
Start: 2024-11-04 | End: 2025-06-24

## 2025-03-05 RX ORDER — ONDANSETRON 4 MG/1
1 TABLET TABLET, ORALLY DISINTEGRATING ORAL
Qty: 60 | Refills: 3 | Status: ON HOLD | COMMUNITY
Start: 2024-10-02

## 2025-03-05 RX ORDER — COLESTIPOL HYDROCHLORIDE 1 G/1
2 TABLETS TABLET, FILM COATED ORAL ONCE A DAY
Qty: 180 TABLET | Refills: 1 | Status: ACTIVE | COMMUNITY
Start: 2025-02-19

## 2025-03-05 RX ORDER — COLESEVELAM HYDROCHLORIDE 625 MG/1
3 TABLETS WITH MEALS TABLET, COATED ORAL TWICE A DAY
Qty: 540 TABLET | Refills: 1 | OUTPATIENT
Start: 2025-03-05

## 2025-03-05 RX ORDER — OMEPRAZOLE 40 MG/1
1 CAPSULE 30 MINUTES BEFORE MORNING MEAL CAPSULE, DELAYED RELEASE ORAL TWICE A DAY
Qty: 180 | Refills: 0 | Status: ACTIVE | COMMUNITY
Start: 2025-02-19

## 2025-03-05 RX ORDER — OXYCODONE HYDROCHLORIDE 5 MG/1
1 TABLET AS NEEDED TABLET ORAL
Status: ON HOLD | COMMUNITY

## 2025-03-05 RX ORDER — PANTOPRAZOLE SODIUM 40 MG/1
1 TABLET 1/2 TO 1 HOUR BEFORE MORNING MEAL TABLET, DELAYED RELEASE ORAL ONCE A DAY
Status: ON HOLD | COMMUNITY

## 2025-03-05 RX ORDER — OMEPRAZOLE 40 MG/1
1 CAPSULE 30 MINUTES BEFORE MORNING MEAL CAPSULE, DELAYED RELEASE ORAL TWICE A DAY
Qty: 180 | Refills: 0 | OUTPATIENT

## 2025-03-05 RX ORDER — ONDANSETRON HYDROCHLORIDE 4 MG/1
1 TABLET TABLET, FILM COATED ORAL ONCE A DAY
Status: ACTIVE | COMMUNITY

## 2025-03-05 NOTE — HPI-TODAY'S VISIT:
Last visit 10/2/24 Seen by Merlyn Dutton NP This is a 53-year-old female with a history of nausea, vomiting, and abdominal pain secondary to a partial small bowel obstruction presenting for hospital follow-up. She was seen in hospital consultation 9/10/2024 after she presented to the hospital with the aforementioned complaints.  There was evidence of ileitis on CT scan.  She had a colonoscopy 9/19/2024 demonstrating a 5 mm sigmoid polyp status post removal and a benign-appearing intrinsic moderate stenosis that was not traversed in the terminal ileum status post biopsy.  Pathology was pending at discharge.  Review of her CT scan showed evidence of significant Crohn's disease.  There was concerned that much of her small bowel was adhesed to the lower pelvis and surgery may be difficult in her case.  It was discussed that biologic therapy may need to be initiated without a tissue diagnosis.  She was discharged on a full liquid diet. Per telephone encounter 9/21/2024 Dr. Landon remarked that Crohn's disease was in the differential diagnosis (favored).  The patient reported that she was not feeling well and was having a hard time with fecal incontinence.  She was having 10+ bowel movements per day.  She has been having frequent loose bowel movements with fecal incontinence since discharge.  However, 2 or 3 days ago, this improved.  She is having 2 soft bowel movements per day.  She is no longer experiencing fecal incontinence.  She denies red blood per rectum.  She has intermittent abdominal pain.  She has mild postprandial epigastric pain.  She has occasional pain through the center of her abdomen or in the right upper quadrant.  This pain is intermittent and may be associated with meals or with the need for a bowel movement.  She has intermittent nausea.  She denies vomiting.  She is compliant with pantoprazole 40 mg daily and denies acid reflux or dysphagia.  She has been on a full liquid diet.  She has been drinking boost or Ensure but finds it distasteful.  She tried eating chicken salad on crackers for 2 days and had worsening abdominal pain and nausea.  She has resumed her previous diet.  She reports a 15 pound weight loss since hospitalization.  She is no longer taking Oxcodone because she exhausted her supply last night.  She reports a fever of 103.6 two days ago.  She reports intermittent low-grade fevers.  She reports a lack of appetite and no energy.  She reports a sensation that she feels as though there is a "rope wrapped around the back of my tongue causing pain."  She denies painful swallowing or a sore throat. . Follow up visit 11/26/24 She is currently on a prednisone taper, she is having bowel movements. She continues to have pain in the RLQ with some radiation into the krzysztof-umbilical region. She has been on a low fiber diet. She denies fevers/chills. She has met with a surgeon, Dr. 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 She has been contacted regarding intiation of Skyrizi, she will continue to work to secure this medication. . Follow-up visit 2/19/2025 She underwent laparoscopic converted to open ileocolectomy on 1/24/2025 with Dr. Bolden.  She was found to have terminal ileitis with an ileal stricture that was removed.  She was discharged home on postoperative day 3 after having bowel movements and tolerating a regular diet. She has started Skyrizi.  Her next infusion is 2/21/2025. She states that since her surgery she has been markedly short of breath, this has not improved, she gets short of breath with activity as well as talking. She has post-prandial diarrhea, non-bloody, this started since her surgery. She also complains of substernal burning and frequent throat clearing. . Follow-up visit 3/5/2025 Labs 2/19/2025: WC 5.9, hemoglobin 12.2, MCV 77, platelet 448, D-dimer 1.12 (elevated) She continues to complain of shortness of breath with exertion. I ordered a CTA of her chest that does not show any acute cardiopulmonary process, specifically there is no evidence of pulmonary embolism. She does not yet have a primary care physician, she is working on establishing care. She has been using omeprazole 40mg BID with excellent response with regards to reflux symptoms. She is also using colestipol 1g BID, with only minimal improvement in her bowel movements frequency/consistency. She is having 2-3 loose bowel movements a day associated with fecal urgency and incontinence.

## 2025-03-19 ENCOUNTER — OFFICE VISIT (OUTPATIENT)
Dept: URBAN - METROPOLITAN AREA CLINIC 112 | Facility: CLINIC | Age: 55
End: 2025-03-19

## 2025-03-20 ENCOUNTER — TELEPHONE ENCOUNTER (OUTPATIENT)
Dept: URBAN - METROPOLITAN AREA CLINIC 113 | Facility: CLINIC | Age: 55
End: 2025-03-20

## 2025-03-20 ENCOUNTER — OFFICE VISIT (OUTPATIENT)
Dept: URBAN - METROPOLITAN AREA CLINIC 112 | Facility: CLINIC | Age: 55
End: 2025-03-20
Payer: COMMERCIAL

## 2025-03-20 VITALS
HEART RATE: 78 BPM | BODY MASS INDEX: 36.99 KG/M2 | TEMPERATURE: 97.2 F | WEIGHT: 201 LBS | RESPIRATION RATE: 18 BRPM | HEIGHT: 62 IN | DIASTOLIC BLOOD PRESSURE: 71 MMHG | SYSTOLIC BLOOD PRESSURE: 103 MMHG

## 2025-03-20 DIAGNOSIS — K50.018 CROHN'S DISEASE OF SMALL INTESTINE WITH OTHER COMPLICATION: ICD-10-CM

## 2025-03-20 PROCEDURE — 96413 CHEMO IV INFUSION 1 HR: CPT | Performed by: INTERNAL MEDICINE

## 2025-03-20 RX ORDER — OMEPRAZOLE 40 MG/1
1 CAPSULE 30 MINUTES BEFORE MORNING MEAL CAPSULE, DELAYED RELEASE ORAL TWICE A DAY
Qty: 180 | Refills: 0 | Status: ACTIVE | COMMUNITY

## 2025-03-20 RX ORDER — RISANKIZUMAB-RZAA 60 MG/ML
600 MG INJECTION INTRAVENOUS
Qty: 600 UNSPECIFIED | Refills: 2 | Status: ACTIVE | COMMUNITY
Start: 2024-11-04 | End: 2025-06-24

## 2025-03-20 RX ORDER — ONDANSETRON HYDROCHLORIDE 4 MG/1
1 TABLET TABLET, FILM COATED ORAL ONCE A DAY
Status: ACTIVE | COMMUNITY

## 2025-03-20 RX ORDER — OXYCODONE HYDROCHLORIDE 5 MG/1
1 TABLET AS NEEDED TABLET ORAL
Status: ON HOLD | COMMUNITY

## 2025-03-20 RX ORDER — PANTOPRAZOLE SODIUM 40 MG/1
1 TABLET 1/2 TO 1 HOUR BEFORE MORNING MEAL TABLET, DELAYED RELEASE ORAL ONCE A DAY
Status: ON HOLD | COMMUNITY

## 2025-03-20 RX ORDER — ONDANSETRON 4 MG/1
1 TABLET TABLET, ORALLY DISINTEGRATING ORAL
Qty: 60 | Refills: 3 | Status: ON HOLD | COMMUNITY
Start: 2024-10-02

## 2025-03-20 RX ORDER — COLESEVELAM HYDROCHLORIDE 625 MG/1
3 TABLETS WITH MEALS TABLET, COATED ORAL TWICE A DAY
Qty: 540 TABLET | Refills: 1 | Status: ACTIVE | COMMUNITY
Start: 2025-03-05

## 2025-03-20 RX ORDER — COLESTIPOL HYDROCHLORIDE 1 G/1
2 TABLETS TABLET, FILM COATED ORAL ONCE A DAY
Qty: 180 TABLET | Refills: 1 | Status: ACTIVE | COMMUNITY
Start: 2025-02-19

## 2025-03-21 ENCOUNTER — OFFICE VISIT (OUTPATIENT)
Dept: URBAN - METROPOLITAN AREA CLINIC 112 | Facility: CLINIC | Age: 55
End: 2025-03-21

## 2025-04-04 ENCOUNTER — TELEPHONE ENCOUNTER (OUTPATIENT)
Dept: URBAN - METROPOLITAN AREA CLINIC 113 | Facility: CLINIC | Age: 55
End: 2025-04-04

## 2025-04-07 ENCOUNTER — OFFICE VISIT (OUTPATIENT)
Dept: URBAN - METROPOLITAN AREA CLINIC 113 | Facility: CLINIC | Age: 55
End: 2025-04-07

## 2025-04-17 ENCOUNTER — TELEPHONE ENCOUNTER (OUTPATIENT)
Dept: URBAN - METROPOLITAN AREA CLINIC 113 | Facility: CLINIC | Age: 55
End: 2025-04-17

## 2025-04-22 ENCOUNTER — TELEPHONE ENCOUNTER (OUTPATIENT)
Dept: URBAN - METROPOLITAN AREA CLINIC 113 | Facility: CLINIC | Age: 55
End: 2025-04-22

## 2025-04-22 RX ORDER — RISANKIZUMAB-RZAA 360 MG/2.4
2.4 ML WEARABLE INJECTOR SUBCUTANEOUS
Qty: 1 KIT | Refills: 11 | OUTPATIENT
Start: 2025-04-23

## 2025-05-02 ENCOUNTER — TELEPHONE ENCOUNTER (OUTPATIENT)
Dept: URBAN - METROPOLITAN AREA CLINIC 113 | Facility: CLINIC | Age: 55
End: 2025-05-02

## 2025-05-12 ENCOUNTER — P2P PATIENT RECORD (OUTPATIENT)
Age: 55
End: 2025-05-12

## 2025-05-22 ENCOUNTER — OFFICE VISIT (OUTPATIENT)
Dept: URBAN - METROPOLITAN AREA CLINIC 113 | Facility: CLINIC | Age: 55
End: 2025-05-22

## 2025-05-22 DIAGNOSIS — R19.7 ACUTE DIARRHEA: ICD-10-CM

## 2025-05-22 DIAGNOSIS — K21.9 GASTROESOPHAGEAL REFLUX DISEASE, UNSPECIFIED WHETHER ESOPHAGITIS PRESENT: ICD-10-CM

## 2025-05-22 DIAGNOSIS — K50.018 CROHN'S DISEASE OF SMALL INTESTINE WITH OTHER COMPLICATION: ICD-10-CM

## 2025-05-22 PROCEDURE — 99214 OFFICE O/P EST MOD 30 MIN: CPT | Performed by: STUDENT IN AN ORGANIZED HEALTH CARE EDUCATION/TRAINING PROGRAM

## 2025-05-22 RX ORDER — RISANKIZUMAB-RZAA 60 MG/ML
600 MG INJECTION INTRAVENOUS
Qty: 600 UNSPECIFIED | Refills: 2 | Status: ACTIVE | COMMUNITY
Start: 2024-11-04 | End: 2025-06-24

## 2025-05-22 RX ORDER — OMEPRAZOLE 40 MG/1
1 CAPSULE 30 MINUTES BEFORE MORNING MEAL CAPSULE, DELAYED RELEASE ORAL TWICE A DAY
Qty: 180 | Refills: 0 | Status: ACTIVE | COMMUNITY

## 2025-05-22 RX ORDER — PROMETHAZINE HYDROCHLORIDE 25 MG/1
AS DIRECTED TABLET ORAL
Status: ACTIVE | COMMUNITY

## 2025-05-22 RX ORDER — RISANKIZUMAB-RZAA 360 MG/2.4
2.4 ML WEARABLE INJECTOR SUBCUTANEOUS
Qty: 1 KIT | Refills: 11 | Status: ACTIVE | COMMUNITY
Start: 2025-04-23

## 2025-05-22 RX ORDER — ONDANSETRON HYDROCHLORIDE 4 MG/1
1 TABLET TABLET, FILM COATED ORAL ONCE A DAY
Status: ACTIVE | COMMUNITY

## 2025-05-22 RX ORDER — PANTOPRAZOLE SODIUM 40 MG/1
1 TABLET 1/2 TO 1 HOUR BEFORE MORNING MEAL TABLET, DELAYED RELEASE ORAL ONCE A DAY
Status: ON HOLD | COMMUNITY

## 2025-05-22 RX ORDER — COLESTIPOL HYDROCHLORIDE 1 G/1
2 TABLETS TABLET, FILM COATED ORAL ONCE A DAY
Qty: 180 TABLET | Refills: 1 | Status: ACTIVE | COMMUNITY
Start: 2025-02-19

## 2025-05-22 RX ORDER — ONDANSETRON 4 MG/1
1 TABLET TABLET, ORALLY DISINTEGRATING ORAL
Qty: 60 | Refills: 3 | Status: ON HOLD | COMMUNITY
Start: 2024-10-02

## 2025-05-22 RX ORDER — OXYCODONE HYDROCHLORIDE 5 MG/1
1 TABLET AS NEEDED TABLET ORAL
Status: ON HOLD | COMMUNITY

## 2025-05-22 RX ORDER — PANTOPRAZOLE SODIUM 40 MG/1
1 TABLET 1/2 TO 1 HOUR BEFORE MORNING AND EVENING MEAL TABLET, DELAYED RELEASE ORAL TWICE A DAY
Qty: 180 TABLET | Refills: 1

## 2025-05-22 RX ORDER — DICYCLOMINE HYDROCHLORIDE 20 MG/1
1 TABLET TABLET ORAL THREE TIMES A DAY
Status: ACTIVE | COMMUNITY

## 2025-05-22 RX ORDER — COLESEVELAM HYDROCHLORIDE 625 MG/1
3 TABLETS WITH MEALS TABLET, COATED ORAL TWICE A DAY
Qty: 540 TABLET | Refills: 1 | Status: ACTIVE | COMMUNITY
Start: 2025-03-05

## 2025-05-22 RX ORDER — PREDNISONE 20 MG/1
1 TABLET WITH FOOD OR MILK TABLET ORAL TWICE A DAY
Status: ON HOLD | COMMUNITY

## 2025-05-22 NOTE — HPI-TODAY'S VISIT:
Last visit 10/2/24 Seen by Merlyn Dutton NP This is a 53-year-old female with a history of nausea, vomiting, and abdominal pain secondary to a partial small bowel obstruction presenting for hospital follow-up. She was seen in hospital consultation 9/10/2024 after she presented to the hospital with the aforementioned complaints.  There was evidence of ileitis on CT scan.  She had a colonoscopy 9/19/2024 demonstrating a 5 mm sigmoid polyp status post removal and a benign-appearing intrinsic moderate stenosis that was not traversed in the terminal ileum status post biopsy.  Pathology was pending at discharge.  Review of her CT scan showed evidence of significant Crohn's disease.  There was concerned that much of her small bowel was adhesed to the lower pelvis and surgery may be difficult in her case.  It was discussed that biologic therapy may need to be initiated without a tissue diagnosis.  She was discharged on a full liquid diet. Per telephone encounter 9/21/2024 Dr. Landon remarked that Crohn's disease was in the differential diagnosis (favored).  The patient reported that she was not feeling well and was having a hard time with fecal incontinence.  She was having 10+ bowel movements per day.  She has been having frequent loose bowel movements with fecal incontinence since discharge.  However, 2 or 3 days ago, this improved.  She is having 2 soft bowel movements per day.  She is no longer experiencing fecal incontinence.  She denies red blood per rectum.  She has intermittent abdominal pain.  She has mild postprandial epigastric pain.  She has occasional pain through the center of her abdomen or in the right upper quadrant.  This pain is intermittent and may be associated with meals or with the need for a bowel movement.  She has intermittent nausea.  She denies vomiting.  She is compliant with pantoprazole 40 mg daily and denies acid reflux or dysphagia.  She has been on a full liquid diet.  She has been drinking boost or Ensure but finds it distasteful.  She tried eating chicken salad on crackers for 2 days and had worsening abdominal pain and nausea.  She has resumed her previous diet.  She reports a 15 pound weight loss since hospitalization.  She is no longer taking Oxcodone because she exhausted her supply last night.  She reports a fever of 103.6 two days ago.  She reports intermittent low-grade fevers.  She reports a lack of appetite and no energy.  She reports a sensation that she feels as though there is a "rope wrapped around the back of my tongue causing pain."  She denies painful swallowing or a sore throat. . Follow up visit 11/26/24 She is currently on a prednisone taper, she is having bowel movements. She continues to have pain in the RLQ with some radiation into the krzysztof-umbilical region. She has been on a low fiber diet. She denies fevers/chills. She has met with a surgeon, Dr. 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 She has been contacted regarding intiation of Skyrizi, she will continue to work to secure this medication. . Follow-up visit 2/19/2025 She underwent laparoscopic converted to open ileocolectomy on 1/24/2025 with Dr. Bolden.  She was found to have terminal ileitis with an ileal stricture that was removed.  She was discharged home on postoperative day 3 after having bowel movements and tolerating a regular diet. She has started Skyrizi.  Her next infusion is 2/21/2025. She states that since her surgery she has been markedly short of breath, this has not improved, she gets short of breath with activity as well as talking. She has post-prandial diarrhea, non-bloody, this started since her surgery. She also complains of substernal burning and frequent throat clearing. . Follow-up visit 3/5/2025 Labs 2/19/2025: WC 5.9, hemoglobin 12.2, MCV 77, platelet 448, D-dimer 1.12 (elevated) She continues to complain of shortness of breath with exertion. I ordered a CTA of her chest that does not show any acute cardiopulmonary process, specifically there is no evidence of pulmonary embolism. She does not yet have a primary care physician, she is working on establishing care. She has been using omeprazole 40mg BID with excellent response with regards to reflux symptoms. She is also using colestipol 1g BID, with only minimal improvement in her bowel movements frequency/consistency. She is having 2-3 loose bowel movements a day associated with fecal urgency and incontinence. . Follow up visit 5/22/25 She went to the ER at [facility] for RLQ abdominal pain, her labs were unrevealing but her CT reports some fat stranding around her prior surgical resection site. She reports that Welchol 3tab BID caused constipation but 2 tab BID did not improve her diarrheal symptoms. She denies fevers. She is adherent to her Skyrizi injections. SHe currently does not have insurance.

## 2025-07-16 ENCOUNTER — LAB OUTSIDE AN ENCOUNTER (OUTPATIENT)
Dept: URBAN - METROPOLITAN AREA CLINIC 113 | Facility: CLINIC | Age: 55
End: 2025-07-16

## 2025-07-23 LAB — CALPROTECTIN, FECAL: 237

## 2025-08-20 ENCOUNTER — OFFICE VISIT (OUTPATIENT)
Dept: URBAN - METROPOLITAN AREA CLINIC 113 | Facility: CLINIC | Age: 55
End: 2025-08-20

## 2025-08-20 ENCOUNTER — TELEPHONE ENCOUNTER (OUTPATIENT)
Dept: URBAN - METROPOLITAN AREA CLINIC 113 | Facility: CLINIC | Age: 55
End: 2025-08-20

## 2025-08-20 DIAGNOSIS — R19.7 ACUTE DIARRHEA: ICD-10-CM

## 2025-08-20 DIAGNOSIS — K50.018 CROHN'S DISEASE OF SMALL INTESTINE WITH OTHER COMPLICATION: ICD-10-CM

## 2025-08-20 DIAGNOSIS — K21.9 GASTROESOPHAGEAL REFLUX DISEASE, UNSPECIFIED WHETHER ESOPHAGITIS PRESENT: ICD-10-CM

## 2025-08-20 DIAGNOSIS — K60.2 ANAL FISSURE: ICD-10-CM

## 2025-08-20 PROCEDURE — 99214 OFFICE O/P EST MOD 30 MIN: CPT | Performed by: STUDENT IN AN ORGANIZED HEALTH CARE EDUCATION/TRAINING PROGRAM

## 2025-08-20 RX ORDER — COLESTIPOL HYDROCHLORIDE 1 G/1
2 TABLETS TABLET, FILM COATED ORAL ONCE A DAY
Qty: 180 TABLET | Refills: 1 | Status: ACTIVE | COMMUNITY
Start: 2025-02-19

## 2025-08-20 RX ORDER — ONDANSETRON HYDROCHLORIDE 4 MG/1
1 TABLET TABLET, FILM COATED ORAL ONCE A DAY
Qty: 90 TABLET | Refills: 1

## 2025-08-20 RX ORDER — OXYCODONE HYDROCHLORIDE 5 MG/1
1 TABLET AS NEEDED TABLET ORAL
Status: ON HOLD | COMMUNITY

## 2025-08-20 RX ORDER — PROMETHAZINE HYDROCHLORIDE 25 MG/1
AS DIRECTED TABLET ORAL
Status: ACTIVE | COMMUNITY

## 2025-08-20 RX ORDER — PREDNISONE 20 MG/1
1 TABLET WITH FOOD OR MILK TABLET ORAL TWICE A DAY
Status: ON HOLD | COMMUNITY

## 2025-08-20 RX ORDER — ONDANSETRON 4 MG/1
1 TABLET TABLET, ORALLY DISINTEGRATING ORAL
OUTPATIENT
Start: 2024-10-02

## 2025-08-20 RX ORDER — ONDANSETRON HYDROCHLORIDE 4 MG/1
1 TABLET TABLET, FILM COATED ORAL ONCE A DAY
Status: ACTIVE | COMMUNITY

## 2025-08-20 RX ORDER — RISANKIZUMAB-RZAA 360 MG/2.4
2.4 ML WEARABLE INJECTOR SUBCUTANEOUS
Qty: 1 KIT | Refills: 11 | Status: ACTIVE | COMMUNITY
Start: 2025-04-23

## 2025-08-20 RX ORDER — OXYCODONE HYDROCHLORIDE 5 MG/1
1 TABLET AS NEEDED TABLET ORAL
OUTPATIENT

## 2025-08-20 RX ORDER — PANTOPRAZOLE SODIUM 40 MG/1
1 TABLET 1/2 TO 1 HOUR BEFORE MORNING AND EVENING MEAL TABLET, DELAYED RELEASE ORAL TWICE A DAY
Qty: 180 TABLET | Refills: 1 | Status: ACTIVE | COMMUNITY

## 2025-08-20 RX ORDER — DICYCLOMINE HYDROCHLORIDE 20 MG/1
1 TABLET TABLET ORAL THREE TIMES A DAY
Status: ACTIVE | COMMUNITY

## 2025-08-20 RX ORDER — COLESEVELAM HYDROCHLORIDE 625 MG/1
3 TABLETS WITH MEALS TABLET, COATED ORAL TWICE A DAY
OUTPATIENT
Start: 2025-03-05

## 2025-08-20 RX ORDER — ONDANSETRON 4 MG/1
1 TABLET TABLET, ORALLY DISINTEGRATING ORAL
Qty: 60 | Refills: 3 | Status: ON HOLD | COMMUNITY
Start: 2024-10-02

## 2025-08-20 RX ORDER — COLESEVELAM HYDROCHLORIDE 625 MG/1
3 TABLETS WITH MEALS TABLET, COATED ORAL TWICE A DAY
Qty: 540 TABLET | Refills: 1 | Status: ACTIVE | COMMUNITY
Start: 2025-03-05

## 2025-08-20 RX ORDER — OMEPRAZOLE 40 MG/1
1 CAPSULE 30 MINUTES BEFORE MORNING MEAL CAPSULE, DELAYED RELEASE ORAL TWICE A DAY
Qty: 180 | Refills: 0 | Status: ACTIVE | COMMUNITY

## 2025-08-20 RX ORDER — POLYETHYLENE GLYCOL 3350, SODIUM CHLORIDE, SODIUM BICARBONATE, POTASSIUM CHLORIDE 420; 11.2; 5.72; 1.48 G/4L; G/4L; G/4L; G/4L
4000 ML POWDER, FOR SOLUTION ORAL AS DIRECTED
Qty: 1 KIT | Refills: 0 | OUTPATIENT
Start: 2025-08-20 | End: 2025-08-22

## 2025-08-20 RX ORDER — DICYCLOMINE HYDROCHLORIDE 20 MG/1
1 TABLET TABLET ORAL THREE TIMES A DAY
OUTPATIENT

## 2025-08-20 RX ORDER — PREDNISONE 20 MG/1
1 TABLET WITH FOOD OR MILK TABLET ORAL TWICE A DAY
OUTPATIENT

## 2025-08-20 RX ORDER — PANTOPRAZOLE SODIUM 40 MG/1
1 TABLET 1/2 TO 1 HOUR BEFORE MORNING AND EVENING MEAL TABLET, DELAYED RELEASE ORAL TWICE A DAY
Qty: 180 TABLET | Refills: 1
Start: 2025-08-20

## 2025-08-20 RX ORDER — SULFAMETHOXAZOLE AND TRIMETHOPRIM 800; 160 MG/1; MG/1
1 TABLET TABLET ORAL TWICE A DAY
Qty: 14 TABLET | Refills: 0 | OUTPATIENT
Start: 2025-08-20 | End: 2025-08-27

## 2025-08-21 LAB
A/G RATIO: 1.3
ABSOLUTE BASOPHILS: 30
ABSOLUTE EOSINOPHILS: 84
ABSOLUTE LYMPHOCYTES: 1809
ABSOLUTE MONOCYTES: 494
ABSOLUTE NEUTROPHILS: 5183
ALBUMIN: 4.2
ALKALINE PHOSPHATASE: 180
ALT (SGPT): 36
AST (SGOT): 21
BASOPHILS: 0.4
BILIRUBIN, TOTAL: 0.3
BUN/CREATININE RATIO: (no result)
BUN: 13
C-REACTIVE PROTEIN, QUANT: 13
CALCIUM: 10
CARBON DIOXIDE, TOTAL: 28
CHLORIDE: 102
CREATININE: 0.55
EGFR: 108
EOSINOPHILS: 1.1
GLOBULIN, TOTAL: 3.2
GLUCOSE: 82
HEMATOCRIT: 38.8
HEMOGLOBIN: 12.3
LYMPHOCYTES: 23.8
MCH: 24.9
MCHC: 31.7
MCV: 78.7
MONOCYTES: 6.5
MPV: 9.3
NEUTROPHILS: 68.2
PLATELET COUNT: 354
POTASSIUM: 4.1
PROTEIN, TOTAL: 7.4
RDW: 15.6
RED BLOOD CELL COUNT: 4.93
SED RATE BY MODIFIED: 9
SODIUM: 139
WHITE BLOOD CELL COUNT: 7.6